# Patient Record
Sex: MALE | Race: WHITE | Employment: FULL TIME | URBAN - METROPOLITAN AREA
[De-identification: names, ages, dates, MRNs, and addresses within clinical notes are randomized per-mention and may not be internally consistent; named-entity substitution may affect disease eponyms.]

---

## 2017-05-23 PROBLEM — M10.9 GOUT INVOLVING TOE: Status: ACTIVE | Noted: 2017-05-23

## 2017-05-23 PROBLEM — E78.00 PURE HYPERCHOLESTEROLEMIA: Status: ACTIVE | Noted: 2017-05-23

## 2017-12-28 RX ORDER — LOSARTAN POTASSIUM 50 MG/1
50 TABLET ORAL DAILY
Qty: 30 TABLET | Refills: 5 | Status: SHIPPED | OUTPATIENT
Start: 2017-12-28 | End: 2018-01-09 | Stop reason: SDUPTHER

## 2018-01-09 PROBLEM — I10 ESSENTIAL HYPERTENSION: Status: ACTIVE | Noted: 2018-01-09

## 2018-01-09 PROBLEM — M10.079 IDIOPATHIC GOUT INVOLVING TOE: Status: ACTIVE | Noted: 2018-01-09

## 2020-02-21 ENCOUNTER — ANESTHESIA (OUTPATIENT)
Dept: OPERATING ROOM | Age: 45
End: 2020-02-21
Payer: MEDICARE

## 2020-02-21 ENCOUNTER — ANESTHESIA EVENT (OUTPATIENT)
Dept: OPERATING ROOM | Age: 45
End: 2020-02-21
Payer: MEDICARE

## 2020-02-21 ENCOUNTER — HOSPITAL ENCOUNTER (OUTPATIENT)
Age: 45
Setting detail: OUTPATIENT SURGERY
Discharge: HOME | End: 2020-02-21
Attending: SURGERY | Admitting: SURGERY
Payer: MEDICARE

## 2020-02-21 VITALS
RESPIRATION RATE: 12 BRPM | HEART RATE: 74 BPM | DIASTOLIC BLOOD PRESSURE: 69 MMHG | HEIGHT: 75 IN | SYSTOLIC BLOOD PRESSURE: 112 MMHG | TEMPERATURE: 97.5 F | BODY MASS INDEX: 38.3 KG/M2 | WEIGHT: 308 LBS | OXYGEN SATURATION: 93 %

## 2020-02-21 VITALS — OXYGEN SATURATION: 100 % | DIASTOLIC BLOOD PRESSURE: 54 MMHG | SYSTOLIC BLOOD PRESSURE: 109 MMHG | TEMPERATURE: 98.2 F

## 2020-02-21 PROCEDURE — 3600000013 HC SURGERY LEVEL 3 ADDTL 15MIN: Performed by: SURGERY

## 2020-02-21 PROCEDURE — 3700000000 HC ANESTHESIA ATTENDED CARE: Performed by: SURGERY

## 2020-02-21 PROCEDURE — 3600000003 HC SURGERY LEVEL 3 BASE: Performed by: SURGERY

## 2020-02-21 PROCEDURE — 6360000002 HC RX W HCPCS: Performed by: SURGERY

## 2020-02-21 PROCEDURE — 7100000031 HC ASPR PHASE II RECOVERY - ADDTL 15 MIN: Performed by: SURGERY

## 2020-02-21 PROCEDURE — 6360000002 HC RX W HCPCS: Performed by: NURSE ANESTHETIST, CERTIFIED REGISTERED

## 2020-02-21 PROCEDURE — 7100000030 HC ASPR PHASE II RECOVERY - FIRST 15 MIN: Performed by: SURGERY

## 2020-02-21 PROCEDURE — 7100000000 HC PACU RECOVERY - FIRST 15 MIN: Performed by: SURGERY

## 2020-02-21 PROCEDURE — 7100000001 HC PACU RECOVERY - ADDTL 15 MIN: Performed by: SURGERY

## 2020-02-21 PROCEDURE — 3700000001 HC ADD 15 MINUTES (ANESTHESIA): Performed by: SURGERY

## 2020-02-21 PROCEDURE — 2500000003 HC RX 250 WO HCPCS: Performed by: NURSE ANESTHETIST, CERTIFIED REGISTERED

## 2020-02-21 PROCEDURE — 2580000003 HC RX 258: Performed by: ANESTHESIOLOGY

## 2020-02-21 PROCEDURE — 6370000000 HC RX 637 (ALT 250 FOR IP): Performed by: ANESTHESIOLOGY

## 2020-02-21 PROCEDURE — C1781 MESH (IMPLANTABLE): HCPCS | Performed by: SURGERY

## 2020-02-21 PROCEDURE — 2709999900 HC NON-CHARGEABLE SUPPLY: Performed by: SURGERY

## 2020-02-21 PROCEDURE — 2580000003 HC RX 258: Performed by: NURSE ANESTHETIST, CERTIFIED REGISTERED

## 2020-02-21 DEVICE — PATCH HERN L DIA3.2IN CIR W/ STRP SEPRA TECHNOLOGY ABSRB: Type: IMPLANTABLE DEVICE | Site: ABDOMEN | Status: FUNCTIONAL

## 2020-02-21 RX ORDER — ONDANSETRON 2 MG/ML
INJECTION INTRAMUSCULAR; INTRAVENOUS PRN
Status: DISCONTINUED | OUTPATIENT
Start: 2020-02-21 | End: 2020-02-21 | Stop reason: SDUPTHER

## 2020-02-21 RX ORDER — GLYCOPYRROLATE 1 MG/5 ML
SYRINGE (ML) INTRAVENOUS PRN
Status: DISCONTINUED | OUTPATIENT
Start: 2020-02-21 | End: 2020-02-21 | Stop reason: SDUPTHER

## 2020-02-21 RX ORDER — SULFAMETHOXAZOLE AND TRIMETHOPRIM 800; 160 MG/1; MG/1
1 TABLET ORAL 2 TIMES DAILY
Qty: 20 TABLET | Refills: 0 | Status: SHIPPED | OUTPATIENT
Start: 2020-02-21 | End: 2020-03-02

## 2020-02-21 RX ORDER — DIPHENHYDRAMINE HYDROCHLORIDE 50 MG/ML
12.5 INJECTION INTRAMUSCULAR; INTRAVENOUS
Status: DISCONTINUED | OUTPATIENT
Start: 2020-02-21 | End: 2020-02-21 | Stop reason: HOSPADM

## 2020-02-21 RX ORDER — ONDANSETRON 4 MG/1
TABLET, FILM COATED ORAL
Qty: 20 TABLET | Refills: 0 | Status: SHIPPED | OUTPATIENT
Start: 2020-02-21 | End: 2020-05-14

## 2020-02-21 RX ORDER — MORPHINE SULFATE 2 MG/ML
2 INJECTION, SOLUTION INTRAMUSCULAR; INTRAVENOUS EVERY 5 MIN PRN
Status: DISCONTINUED | OUTPATIENT
Start: 2020-02-21 | End: 2020-02-21 | Stop reason: HOSPADM

## 2020-02-21 RX ORDER — DEXAMETHASONE SODIUM PHOSPHATE 4 MG/ML
INJECTION, SOLUTION INTRA-ARTICULAR; INTRALESIONAL; INTRAMUSCULAR; INTRAVENOUS; SOFT TISSUE PRN
Status: DISCONTINUED | OUTPATIENT
Start: 2020-02-21 | End: 2020-02-21 | Stop reason: SDUPTHER

## 2020-02-21 RX ORDER — SODIUM CHLORIDE, SODIUM LACTATE, POTASSIUM CHLORIDE, CALCIUM CHLORIDE 600; 310; 30; 20 MG/100ML; MG/100ML; MG/100ML; MG/100ML
INJECTION, SOLUTION INTRAVENOUS CONTINUOUS
Status: DISCONTINUED | OUTPATIENT
Start: 2020-02-21 | End: 2020-02-21 | Stop reason: HOSPADM

## 2020-02-21 RX ORDER — NEOSTIGMINE METHYLSULFATE 5 MG/5 ML
SYRINGE (ML) INTRAVENOUS PRN
Status: DISCONTINUED | OUTPATIENT
Start: 2020-02-21 | End: 2020-02-21 | Stop reason: SDUPTHER

## 2020-02-21 RX ORDER — LIDOCAINE HYDROCHLORIDE 10 MG/ML
INJECTION, SOLUTION EPIDURAL; INFILTRATION; INTRACAUDAL; PERINEURAL PRN
Status: DISCONTINUED | OUTPATIENT
Start: 2020-02-21 | End: 2020-02-21 | Stop reason: SDUPTHER

## 2020-02-21 RX ORDER — MIDAZOLAM HYDROCHLORIDE 1 MG/ML
INJECTION INTRAMUSCULAR; INTRAVENOUS PRN
Status: DISCONTINUED | OUTPATIENT
Start: 2020-02-21 | End: 2020-02-21 | Stop reason: SDUPTHER

## 2020-02-21 RX ORDER — ONDANSETRON 2 MG/ML
4 INJECTION INTRAMUSCULAR; INTRAVENOUS
Status: DISCONTINUED | OUTPATIENT
Start: 2020-02-21 | End: 2020-02-21 | Stop reason: HOSPADM

## 2020-02-21 RX ORDER — SODIUM CHLORIDE, SODIUM LACTATE, POTASSIUM CHLORIDE, CALCIUM CHLORIDE 600; 310; 30; 20 MG/100ML; MG/100ML; MG/100ML; MG/100ML
INJECTION, SOLUTION INTRAVENOUS CONTINUOUS PRN
Status: DISCONTINUED | OUTPATIENT
Start: 2020-02-21 | End: 2020-02-21 | Stop reason: SDUPTHER

## 2020-02-21 RX ORDER — MEPERIDINE HYDROCHLORIDE 25 MG/ML
12.5 INJECTION INTRAMUSCULAR; INTRAVENOUS; SUBCUTANEOUS EVERY 5 MIN PRN
Status: DISCONTINUED | OUTPATIENT
Start: 2020-02-21 | End: 2020-02-21 | Stop reason: HOSPADM

## 2020-02-21 RX ORDER — PROPOFOL 10 MG/ML
INJECTION, EMULSION INTRAVENOUS PRN
Status: DISCONTINUED | OUTPATIENT
Start: 2020-02-21 | End: 2020-02-21 | Stop reason: SDUPTHER

## 2020-02-21 RX ORDER — FENTANYL CITRATE 50 UG/ML
INJECTION, SOLUTION INTRAMUSCULAR; INTRAVENOUS PRN
Status: DISCONTINUED | OUTPATIENT
Start: 2020-02-21 | End: 2020-02-21 | Stop reason: SDUPTHER

## 2020-02-21 RX ORDER — HYDROMORPHONE HCL 110MG/55ML
PATIENT CONTROLLED ANALGESIA SYRINGE INTRAVENOUS PRN
Status: DISCONTINUED | OUTPATIENT
Start: 2020-02-21 | End: 2020-02-21 | Stop reason: SDUPTHER

## 2020-02-21 RX ORDER — OXYCODONE HYDROCHLORIDE AND ACETAMINOPHEN 5; 325 MG/1; MG/1
2 TABLET ORAL EVERY 4 HOURS PRN
Status: DISCONTINUED | OUTPATIENT
Start: 2020-02-21 | End: 2020-02-21 | Stop reason: HOSPADM

## 2020-02-21 RX ORDER — CEPHALEXIN 500 MG/1
CAPSULE ORAL
Qty: 21 CAPSULE | Refills: 0 | Status: SHIPPED | OUTPATIENT
Start: 2020-02-21 | End: 2020-05-14

## 2020-02-21 RX ORDER — LABETALOL 20 MG/4 ML (5 MG/ML) INTRAVENOUS SYRINGE
5 EVERY 10 MIN PRN
Status: DISCONTINUED | OUTPATIENT
Start: 2020-02-21 | End: 2020-02-21 | Stop reason: HOSPADM

## 2020-02-21 RX ORDER — ROCURONIUM BROMIDE 10 MG/ML
INJECTION, SOLUTION INTRAVENOUS PRN
Status: DISCONTINUED | OUTPATIENT
Start: 2020-02-21 | End: 2020-02-21 | Stop reason: SDUPTHER

## 2020-02-21 RX ADMIN — ROCURONIUM BROMIDE 50 MG: 10 INJECTION, SOLUTION INTRAVENOUS at 07:50

## 2020-02-21 RX ADMIN — Medication 0.8 MG: at 08:51

## 2020-02-21 RX ADMIN — FENTANYL CITRATE 100 MCG: 50 INJECTION, SOLUTION INTRAMUSCULAR; INTRAVENOUS at 07:50

## 2020-02-21 RX ADMIN — ONDANSETRON 4 MG: 2 INJECTION INTRAMUSCULAR; INTRAVENOUS at 08:15

## 2020-02-21 RX ADMIN — Medication 3 G: at 08:02

## 2020-02-21 RX ADMIN — PROPOFOL 200 MG: 10 INJECTION, EMULSION INTRAVENOUS at 07:50

## 2020-02-21 RX ADMIN — DEXAMETHASONE SODIUM PHOSPHATE 4 MG: 4 INJECTION, SOLUTION INTRA-ARTICULAR; INTRALESIONAL; INTRAMUSCULAR; INTRAVENOUS; SOFT TISSUE at 08:15

## 2020-02-21 RX ADMIN — ROCURONIUM BROMIDE 10 MG: 10 INJECTION, SOLUTION INTRAVENOUS at 08:13

## 2020-02-21 RX ADMIN — OXYCODONE HYDROCHLORIDE AND ACETAMINOPHEN 2 TABLET: 5; 325 TABLET ORAL at 10:27

## 2020-02-21 RX ADMIN — LIDOCAINE HYDROCHLORIDE 50 MG: 10 INJECTION, SOLUTION EPIDURAL; INFILTRATION; INTRACAUDAL; PERINEURAL at 07:50

## 2020-02-21 RX ADMIN — Medication 3 MG: at 08:51

## 2020-02-21 RX ADMIN — FENTANYL CITRATE 100 MCG: 50 INJECTION, SOLUTION INTRAMUSCULAR; INTRAVENOUS at 08:14

## 2020-02-21 RX ADMIN — SODIUM CHLORIDE, POTASSIUM CHLORIDE, SODIUM LACTATE AND CALCIUM CHLORIDE: 600; 310; 30; 20 INJECTION, SOLUTION INTRAVENOUS at 07:47

## 2020-02-21 RX ADMIN — HYDROMORPHONE HYDROCHLORIDE 1 MG: 2 INJECTION, SOLUTION INTRAMUSCULAR; INTRAVENOUS; SUBCUTANEOUS at 09:08

## 2020-02-21 RX ADMIN — SODIUM CHLORIDE, POTASSIUM CHLORIDE, SODIUM LACTATE AND CALCIUM CHLORIDE: 600; 310; 30; 20 INJECTION, SOLUTION INTRAVENOUS at 07:32

## 2020-02-21 RX ADMIN — HYDROMORPHONE HYDROCHLORIDE 1 MG: 2 INJECTION, SOLUTION INTRAMUSCULAR; INTRAVENOUS; SUBCUTANEOUS at 09:03

## 2020-02-21 RX ADMIN — MIDAZOLAM 2 MG: 1 INJECTION INTRAMUSCULAR; INTRAVENOUS at 07:47

## 2020-02-21 ASSESSMENT — PULMONARY FUNCTION TESTS
PIF_VALUE: 21
PIF_VALUE: 13
PIF_VALUE: 21
PIF_VALUE: 5
PIF_VALUE: 14
PIF_VALUE: 23
PIF_VALUE: 23
PIF_VALUE: 1
PIF_VALUE: 24
PIF_VALUE: 22
PIF_VALUE: 23
PIF_VALUE: 25
PIF_VALUE: 1
PIF_VALUE: 24
PIF_VALUE: 21
PIF_VALUE: 20
PIF_VALUE: 24
PIF_VALUE: 23
PIF_VALUE: 1
PIF_VALUE: 29
PIF_VALUE: 21
PIF_VALUE: 21
PIF_VALUE: 25
PIF_VALUE: 24
PIF_VALUE: 24
PIF_VALUE: 25
PIF_VALUE: 23
PIF_VALUE: 13
PIF_VALUE: 24
PIF_VALUE: 24
PIF_VALUE: 23
PIF_VALUE: 21
PIF_VALUE: 25
PIF_VALUE: 21
PIF_VALUE: 22
PIF_VALUE: 21
PIF_VALUE: 21
PIF_VALUE: 24
PIF_VALUE: 25
PIF_VALUE: 26
PIF_VALUE: 21
PIF_VALUE: 6
PIF_VALUE: 1
PIF_VALUE: 21
PIF_VALUE: 23
PIF_VALUE: 25
PIF_VALUE: 10
PIF_VALUE: 21
PIF_VALUE: 1
PIF_VALUE: 29
PIF_VALUE: 25
PIF_VALUE: 22
PIF_VALUE: 20
PIF_VALUE: 1
PIF_VALUE: 22
PIF_VALUE: 22
PIF_VALUE: 24
PIF_VALUE: 25
PIF_VALUE: 26
PIF_VALUE: 23
PIF_VALUE: 23
PIF_VALUE: 20
PIF_VALUE: 24
PIF_VALUE: 22
PIF_VALUE: 24
PIF_VALUE: 6
PIF_VALUE: 24
PIF_VALUE: 21
PIF_VALUE: 25
PIF_VALUE: 13
PIF_VALUE: 1
PIF_VALUE: 21
PIF_VALUE: 22
PIF_VALUE: 22

## 2020-02-21 ASSESSMENT — PAIN DESCRIPTION - PAIN TYPE
TYPE: SURGICAL PAIN

## 2020-02-21 ASSESSMENT — PAIN SCALES - GENERAL
PAINLEVEL_OUTOF10: 7
PAINLEVEL_OUTOF10: 3
PAINLEVEL_OUTOF10: 0
PAINLEVEL_OUTOF10: 8
PAINLEVEL_OUTOF10: 6
PAINLEVEL_OUTOF10: 7
PAINLEVEL_OUTOF10: 3
PAINLEVEL_OUTOF10: 8

## 2020-02-21 ASSESSMENT — PAIN DESCRIPTION - DESCRIPTORS
DESCRIPTORS: ACHING;SHARP
DESCRIPTORS: ACHING;SHARP
DESCRIPTORS: ACHING
DESCRIPTORS: ACHING;SHARP
DESCRIPTORS: ACHING;SHARP

## 2020-02-21 ASSESSMENT — LIFESTYLE VARIABLES: SMOKING_STATUS: 0

## 2020-02-21 ASSESSMENT — PAIN DESCRIPTION - ORIENTATION
ORIENTATION: ANTERIOR
ORIENTATION: ANTERIOR

## 2020-02-21 ASSESSMENT — ENCOUNTER SYMPTOMS
SHORTNESS OF BREATH: 0
STRIDOR: 0

## 2020-02-21 ASSESSMENT — PAIN DESCRIPTION - LOCATION
LOCATION: ABDOMEN

## 2020-02-21 ASSESSMENT — PAIN - FUNCTIONAL ASSESSMENT: PAIN_FUNCTIONAL_ASSESSMENT: 0-10

## 2020-02-21 NOTE — ANESTHESIA POSTPROCEDURE EVALUATION
POST- ANESTHESIA EVALUATION       Pt Name: Quynh Connor  MRN: [id number]  YOB: 1975  Date of evaluation: 2/21/2020  Time:  10:35 AM      /78   Pulse 76   Temp 97 °F (36.1 °C) (Temporal)   Resp 12   Ht 6' 3\" (1.905 m)   Wt (!) 308 lb (139.7 kg)   SpO2 95%   BMI 38.50 kg/m²      Consciousness Level  Awake  Cardiopulmonary Status  Stable  Pain Adequately Treated YES  Nausea / Vomiting  NO  Adequate Hydration  YES  Anesthesia Related Complications NONE      Electronically signed by Saranya Ferguson MD on 2/21/2020 at 10:35 AM       Department of Anesthesiology  Postprocedure Note    Patient: Quynh Connor  MRN: [id number]  YOB: 1975  Date of evaluation: 2/21/2020  Time:  10:35 AM     Procedure Summary     Date:  02/21/20 Room / Location:  [address] [place] [address] Ousmane Aimn 06 / [facility]: [facility]    Anesthesia Start:  9774 Anesthesia Stop:  0489    Procedure: HERNIA UMBILICAL VENTRAL REPAIR OPEN W/MESH (N/A Abdomen) Diagnosis:  (UMBILICAL VENTRAL HERNIA  (PAT ON ADMIT OFFICE TO [facility]))    Surgeon:  Jael High MD Responsible Provider:  Emerson Velazquez MD    Anesthesia Type:  general ASA Status:  2          Anesthesia Type: general    Chrystal Phase I: Chrystal Score: 10    Chrystal Phase II:      Last vitals: Reviewed and per EMR flowsheets.        Anesthesia Post Evaluation

## 2020-02-21 NOTE — OP NOTE
Preoperative diagnosis: Symptomatic periumbilical ventral hernia    Postoperative diagnosis: Same    Procedure: Open repair symptomatic periumbilical ventral hernia with large ventral Yeison ST mesh    Surgeon: Dr. Yolanda Hussein    Asst.: None    Anesthesia: General    Preparation: ChloraPrep    EBL: Less than 25 mL    Specimen: None    Procedure: 80-year-old male with a large periumbilical ventral hernia was scheduled for repair. Informed consent was obtained. Preoperative antibiotics were given. Patient was taken to the operating room. General anesthesia was given. Abdomen was prepped and draped in usual sterile fashion. Timeout was done. Periumbilical incision was made. Dissection was carried out. Patient was found to have a large amount of incarcerated omentum that was reduced. Preperitoneal space was created. Fascial edges were grasped with help of Kocher clamps. A large ventralex- ST mesh was used for repair. This was inserted in the preperitoneal space. Hemostasis was confirmed. Sponge needle instrument count was found to be correct. Mesh was secured in several different locations using interrupted Ethibond sutures. Satisfactory tension-free repair was achieved. Hemostasis was confirmed. Sponge needle instrument count was found to be correct. Osmar-Gao drain was left in the area of dissection was brought out and secured. After confirming hemostasis sponge needle instrument count abdominal wall layers were approximated using absorbable sutures. Dermabond was applied. Steri-Strips applied. Sterile dressing was applied. Patient tolerated procedure well and was transferred to the recovery room in a stable condition. Recommendations: No lifting more than 5 to 10 pounds for 6 weeks. Diet as tolerated. No driving until seen in the office. Patient may shower. Prescriptions are in the chart. Patient gets his narcotic prescriptions from the pain clinic.

## 2020-02-21 NOTE — H&P
HISTORY and Treinta MARY [facility] [address]       NAME:  Beata Jackson  MRN: [id number]   YOB: 1975   Date: 2/21/2020   Age: 39 y.o. Gender: male       COMPLAINT AND PRESENT HISTORY:   Beata Jackson is 39 y.o.,  male, Is here for  Umbilical  Ventral Hernia. Patient will be having HERNIA UMBILICAL VENTRAL REPAIR OPEN W/MESH  Patient has symptoms of : bulging out and discomfort. Patient noticed the Hernia 2 years ago. The Hernia grew in size. The Hernia is  tender, reducible, expansile on coughing. Pt is more relieved with hernia when: lying down, standing aggravates hernia more. Pt denies any constipation or bowel blockage. Patient has some head congestion and cough that he says clear more in the day. No fever or chills.      PAST MEDICAL HISTORY     Past Medical History:   Diagnosis Date    Chronic back pain     cervical    GERD (gastroesophageal reflux disease)     Gout     Lt  Big Toe     Hyperlipidemia     Kidney stone     Low back pain     Renal insufficiency, mild     Seasonal allergies     hives    Umbilical hernia        SURGICAL HISTORY       Past Surgical History:   Procedure Laterality Date    KIDNEY SURGERY      Stent Placement    TONSILLECTOMY  1982    URETHRAL STRICTURE DILATATION  2001    WRIST SURGERY  2000    Rt       FAMILY HISTORY       Family History   Problem Relation Age of Onset    Diabetes Mother     High Blood Pressure Mother     Heart Disease Mother     Other Mother         DVT    Diabetes Father     Heart Disease Father        SOCIAL HISTORY       Social History     Socioeconomic History    Marital status:      Spouse name: None    Number of children: None    Years of education: None    Highest education level: None   Occupational History    None   Social Needs    Financial resource strain: None    Food insecurity:     Worry: None     Inability: None    Transportation needs:     Medical: None     Non-medical: None Tobacco Use    Smoking status: Never Smoker    Smokeless tobacco: Never Used   Substance and Sexual Activity    Alcohol use: Yes     Comment: socially    Drug use: No    Sexual activity: Yes     Partners: Female   Lifestyle    Physical activity:     Days per week: None     Minutes per session: None    Stress: None   Relationships    Social connections:     Talks on phone: None     Gets together: None     Attends Hoahaoism service: None     Active member of club or organization: None     Attends meetings of clubs or organizations: None     Relationship status: None    Intimate partner violence:     Fear of current or ex partner: None     Emotionally abused: None     Physically abused: None     Forced sexual activity: None   Other Topics Concern    None   Social History Narrative    None           REVIEW OF SYSTEMS      Allergies   Allergen Reactions    Seasonal        No current facility-administered medications on file prior to encounter. Current Outpatient Medications on File Prior to Encounter   Medication Sig Dispense Refill    losartan (COZAAR) 50 MG tablet take 1 tablet by mouth once daily 30 tablet 5    simvastatin (ZOCOR) 20 MG tablet take 1 tablet by mouth every evening 90 tablet 3    allopurinol (ZYLOPRIM) 300 MG tablet take 1 tablet by mouth once daily 90 tablet 3    oxyCODONE-acetaminophen (PERCOCET)  MG per tablet Pain management . 0    naproxen (NAPROSYN) 500 MG tablet Take 1 tablet by mouth 2 times daily (with meals) 60 tablet 3    tiZANidine (ZANAFLEX) 2 MG tablet Take 1 tablet by mouth 3 times daily as needed  0    omalizumab (XOLAIR) 150 MG injection Inject 150 mg into the skin every 28 days. 1 vial 3    ranitidine (ZANTAC) 150 MG tablet Take 1 tablet by mouth daily. 30 tablet 11    fexofenadine (ALLEGRA) 180 MG tablet Take 180 mg by mouth daily      EPIPEN 2-LILI 0.3 MG/0.3ML VLAD injection          Negative except for what is mentioned in the HPI.      GENERAL

## 2020-02-21 NOTE — ANESTHESIA PRE PROCEDURE
Department of Anesthesiology  Preprocedure Note       Name:  Felicia Dalton   Age:  39 y.o.  :  1975                                          MRN:  [id number]         Date:  2020      Surgeon: Javon Yo):  Albert Madrigal MD    Procedure: HERNIA UMBILICAL VENTRAL REPAIR OPEN W/MESH (N/A Abdomen)    Medications prior to admission:   Prior to Admission medications    Medication Sig Start Date End Date Taking? Authorizing Provider   losartan (COZAAR) 50 MG tablet take 1 tablet by mouth once daily 19  Yes Kevon Elaine, APRN - CNP   simvastatin (ZOCOR) 20 MG tablet take 1 tablet by mouth every evening 10/2/19  Yes Kevon Elaine, APRN - CNP   allopurinol (ZYLOPRIM) 300 MG tablet take 1 tablet by mouth once daily 10/2/19  Yes Kevon Saundersing, APRN - CNP   oxyCODONE-acetaminophen (PERCOCET)  MG per tablet Pain management . 17  Yes Historical Provider, MD   naproxen (NAPROSYN) 500 MG tablet Take 1 tablet by mouth 2 times daily (with meals) 10/14/16  Yes Kevon Saundersing, APRN - CNP   tiZANidine (ZANAFLEX) 2 MG tablet Take 1 tablet by mouth 3 times daily as needed 6/4/15  Yes Historical Provider, MD   omalizumab Keegan Diehl) 150 MG injection Inject 150 mg into the skin every 28 days. 3/24/15  Yes Kevon Saundersing, APRN - CNP   ranitidine (ZANTAC) 150 MG tablet Take 1 tablet by mouth daily. 3/15/13  Yes Kevon Elaine, APRN - CNP   fexofenadine (ALLEGRA) 180 MG tablet Take 180 mg by mouth daily    Historical Provider, MD   EPIPEN 2-LILI 0.3 MG/0.3ML VLAD injection  13   Historical Provider, MD       Current medications:    Current Facility-Administered Medications   Medication Dose Route Frequency Provider Last Rate Last Dose    lactated ringers infusion   Intravenous Continuous Zara Jackson  mL/hr at 20 0732      ceFAZolin (ANCEF) 3 g in dextrose 5 % 100 mL IVPB  3 g Intravenous Once Albert Madrigal MD           Allergies:     Allergies   Allergen Reactions    Seasonal        Problem List: Blocker         Neuro/Psych:   Negative Neuro/Psych ROS     (-) seizures, neuromuscular disease, TIA, CVA, headaches, psychiatric history and depression/anxiety            GI/Hepatic/Renal:   (+) GERD: no interval change,      (-) hiatal hernia, PUD, hepatitis, liver disease, no renal disease, bowel prep and no morbid obesity       Endo/Other: Negative Endo/Other ROS   (+) no malignancy/cancer. (-) diabetes mellitus, hypothyroidism, hyperthyroidism, blood dyscrasia, arthritis, no electrolyte abnormalities, no malignancy/cancer               Abdominal:           Vascular: negative vascular ROS. - PVD, DVT and PE. Anesthesia Plan      general     ASA 2       Induction: intravenous. MIPS: Postoperative opioids intended and Prophylactic antiemetics administered. Anesthetic plan and risks discussed with patient. Plan discussed with CRNA.                   Shyla Luna MD   2/21/2020

## 2020-05-14 PROBLEM — Z87.19 STATUS POST HERNIA REPAIR: Status: ACTIVE | Noted: 2020-05-14

## 2020-05-14 PROBLEM — Z98.890 STATUS POST HERNIA REPAIR: Status: ACTIVE | Noted: 2020-05-14

## 2020-05-14 PROBLEM — Z87.442 HISTORY OF KIDNEY STONES: Status: ACTIVE | Noted: 2020-05-14

## 2020-05-22 ENCOUNTER — HOSPITAL ENCOUNTER (OUTPATIENT)
Age: 45
Setting detail: SPECIMEN
Discharge: HOME | End: 2020-05-22
Payer: MEDICARE

## 2020-05-22 LAB
ALBUMIN SERPL-MCNC: 4 G/DL (ref 3.5–5.2)
ALBUMIN/GLOBULIN RATIO: 1.3 (ref 1–2.5)
ALP BLD-CCNC: 104 U/L (ref 40–129)
ALT SERPL-CCNC: 28 U/L (ref 5–41)
ANION GAP SERPL CALCULATED.3IONS-SCNC: 14 MMOL/L (ref 9–17)
AST SERPL-CCNC: 21 U/L
BILIRUB SERPL-MCNC: 0.26 MG/DL (ref 0.3–1.2)
BUN BLDV-MCNC: 18 MG/DL (ref 6–20)
BUN/CREAT BLD: ABNORMAL (ref 9–20)
CALCIUM SERPL-MCNC: 9.1 MG/DL (ref 8.6–10.4)
CHLORIDE BLD-SCNC: 103 MMOL/L (ref 98–107)
CHOLESTEROL/HDL RATIO: 4.4
CHOLESTEROL: 150 MG/DL
CO2: 23 MMOL/L (ref 20–31)
CREAT SERPL-MCNC: 1.24 MG/DL (ref 0.7–1.2)
GFR AFRICAN AMERICAN: >60 ML/MIN
GFR NON-AFRICAN AMERICAN: >60 ML/MIN
GFR SERPL CREATININE-BSD FRML MDRD: ABNORMAL ML/MIN/{1.73_M2}
GFR SERPL CREATININE-BSD FRML MDRD: ABNORMAL ML/MIN/{1.73_M2}
GLUCOSE BLD-MCNC: 80 MG/DL (ref 70–99)
HCT VFR BLD CALC: 46.5 % (ref 40.7–50.3)
HDLC SERPL-MCNC: 34 MG/DL
HEMOGLOBIN: 14.4 G/DL (ref 13–17)
LDL CHOLESTEROL: 82 MG/DL (ref 0–130)
MCH RBC QN AUTO: 29.1 PG (ref 25.2–33.5)
MCHC RBC AUTO-ENTMCNC: 31 G/DL (ref 28.4–34.8)
MCV RBC AUTO: 93.9 FL (ref 82.6–102.9)
NRBC AUTOMATED: 0 PER 100 WBC
PDW BLD-RTO: 13.4 % (ref 11.8–14.4)
PLATELET # BLD: 262 K/UL (ref 138–453)
PMV BLD AUTO: 10.6 FL (ref 8.1–13.5)
POTASSIUM SERPL-SCNC: 4.8 MMOL/L (ref 3.7–5.3)
RBC # BLD: 4.95 M/UL (ref 4.21–5.77)
SODIUM BLD-SCNC: 140 MMOL/L (ref 135–144)
TOTAL PROTEIN: 7 G/DL (ref 6.4–8.3)
TRIGL SERPL-MCNC: 168 MG/DL
URIC ACID: 5.1 MG/DL (ref 3.4–7)
VLDLC SERPL CALC-MCNC: ABNORMAL MG/DL (ref 1–30)
WBC # BLD: 6 K/UL (ref 3.5–11.3)

## 2020-05-24 LAB
ESTIMATED AVERAGE GLUCOSE: 114 MG/DL
HBA1C MFR BLD: 5.6 % (ref 4–6)

## 2024-02-29 ENCOUNTER — TELEPHONE (OUTPATIENT)
Age: 49
End: 2024-02-29

## 2024-02-29 NOTE — TELEPHONE ENCOUNTER
Called patient to schedule surgery. 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   Will call back later to get an update with scheduling form the patient.

## 2025-01-29 ENCOUNTER — HOSPITAL ENCOUNTER (OUTPATIENT)
Age: 50
Setting detail: SPECIMEN
Discharge: HOME | End: 2025-01-29

## 2025-01-29 DIAGNOSIS — N28.9 RENAL INSUFFICIENCY, MILD: ICD-10-CM

## 2025-01-29 DIAGNOSIS — R73.9 HYPERGLYCEMIA: ICD-10-CM

## 2025-01-29 DIAGNOSIS — Z13.9 SCREENING FOR CONDITION: ICD-10-CM

## 2025-01-29 DIAGNOSIS — I10 ESSENTIAL HYPERTENSION: ICD-10-CM

## 2025-01-29 DIAGNOSIS — M10.079 IDIOPATHIC GOUT INVOLVING TOE, UNSPECIFIED CHRONICITY, UNSPECIFIED LATERALITY: ICD-10-CM

## 2025-01-29 DIAGNOSIS — E78.00 PURE HYPERCHOLESTEROLEMIA: ICD-10-CM

## 2025-01-29 LAB
ALBUMIN SERPL-MCNC: 4.3 G/DL (ref 3.5–5.2)
ALBUMIN/GLOB SERPL: 1.5 {RATIO} (ref 1–2.5)
ALP SERPL-CCNC: 113 U/L (ref 40–129)
ALT SERPL-CCNC: 57 U/L (ref 10–50)
ANION GAP SERPL CALCULATED.3IONS-SCNC: 13 MMOL/L (ref 9–16)
AST SERPL-CCNC: 45 U/L (ref 10–50)
BILIRUB SERPL-MCNC: 0.4 MG/DL (ref 0–1.2)
BUN SERPL-MCNC: 13 MG/DL (ref 6–20)
CALCIUM SERPL-MCNC: 9.6 MG/DL (ref 8.6–10.4)
CHLORIDE SERPL-SCNC: 104 MMOL/L (ref 98–107)
CHOLEST SERPL-MCNC: 139 MG/DL (ref 0–199)
CHOLESTEROL/HDL RATIO: 3.8
CO2 SERPL-SCNC: 25 MMOL/L (ref 20–31)
CREAT SERPL-MCNC: 1.3 MG/DL (ref 0.7–1.2)
ERYTHROCYTE [DISTWIDTH] IN BLOOD BY AUTOMATED COUNT: 13.4 % (ref 11.8–14.4)
EST. AVERAGE GLUCOSE BLD GHB EST-MCNC: 120 MG/DL
GFR, ESTIMATED: 67 ML/MIN/1.73M2
GLUCOSE SERPL-MCNC: 84 MG/DL (ref 74–99)
HBA1C MFR BLD: 5.8 % (ref 4–6)
HCT VFR BLD AUTO: 47.1 % (ref 40.7–50.3)
HDLC SERPL-MCNC: 37 MG/DL
HGB BLD-MCNC: 14.7 G/DL (ref 13–17)
LDLC SERPL CALC-MCNC: 64 MG/DL (ref 0–100)
MCH RBC QN AUTO: 29.2 PG (ref 25.2–33.5)
MCHC RBC AUTO-ENTMCNC: 31.2 G/DL (ref 28.4–34.8)
MCV RBC AUTO: 93.6 FL (ref 82.6–102.9)
NRBC BLD-RTO: 0 PER 100 WBC
PLATELET # BLD AUTO: 238 K/UL (ref 138–453)
PMV BLD AUTO: 10 FL (ref 8.1–13.5)
POTASSIUM SERPL-SCNC: 4.7 MMOL/L (ref 3.7–5.3)
PROT SERPL-MCNC: 7.1 G/DL (ref 6.6–8.7)
PSA SERPL-MCNC: 0.6 NG/ML (ref 0–4)
PSA SERPL-MCNC: 0.6 NG/ML (ref 0–4)
RBC # BLD AUTO: 5.03 M/UL (ref 4.21–5.77)
SODIUM SERPL-SCNC: 142 MMOL/L (ref 136–145)
TRIGL SERPL-MCNC: 192 MG/DL
URATE SERPL-MCNC: 7.2 MG/DL (ref 3.4–7)
VLDLC SERPL CALC-MCNC: 38 MG/DL (ref 1–30)
WBC OTHER # BLD: 6.5 K/UL (ref 3.5–11.3)

## 2025-01-30 LAB
BUN SERPL-MCNC: 13 MG/DL (ref 6–20)
CREAT SERPL-MCNC: 1.3 MG/DL (ref 0.7–1.2)
GFR, ESTIMATED: 67 ML/MIN/1.73M2

## 2025-07-14 ENCOUNTER — HOSPITAL ENCOUNTER (OUTPATIENT)
Age: 50
Setting detail: SPECIMEN
Discharge: HOME | End: 2025-07-14

## 2025-07-14 LAB
25(OH)D3 SERPL-MCNC: 15 NG/ML (ref 30–100)
ALBUMIN SERPL-MCNC: 4 G/DL (ref 3.5–5.2)
BASOPHILS # BLD: 0.05 K/UL (ref 0–0.2)
BASOPHILS NFR BLD: 1 % (ref 0–2)
BILIRUB UR QL STRIP: NEGATIVE
CALCIUM UR-MCNC: 9.1 MG/DL
CALCIUM, URINE: 209 MG/24 H (ref 100–300)
CLARITY UR: CLEAR
COLLECT DURATION TIME SPEC: 24 H
COLOR UR: YELLOW
COMMENT: NORMAL
CREAT UR-MCNC: 156 MG/DL (ref 39–259)
CREATINE 24H UR-MRATE: 2276 MG/24 H (ref 1040–2350)
CREATININE URINE: 99 MG/DL
EOSINOPHIL # BLD: 0.21 K/UL (ref 0–0.44)
EOSINOPHILS RELATIVE PERCENT: 3 % (ref 1–4)
ERYTHROCYTE [DISTWIDTH] IN BLOOD BY AUTOMATED COUNT: 13.5 % (ref 11.8–14.4)
GLUCOSE UR STRIP-MCNC: NEGATIVE MG/DL
HCT VFR BLD AUTO: 45.9 % (ref 40.7–50.3)
HGB BLD-MCNC: 14.6 G/DL (ref 13–17)
HGB UR QL STRIP.AUTO: NEGATIVE
IMM GRANULOCYTES # BLD AUTO: <0.03 K/UL (ref 0–0.3)
IMM GRANULOCYTES NFR BLD: 0 %
KETONES UR STRIP-MCNC: NEGATIVE MG/DL
LEUKOCYTE ESTERASE UR QL STRIP: NEGATIVE
LYMPHOCYTES NFR BLD: 2.89 K/UL (ref 1.1–3.7)
LYMPHOCYTES RELATIVE PERCENT: 47 % (ref 24–43)
MAGNESIUM 24 HOUR URINE: 80 MG/24 H (ref 72.9–121.5)
MAGNESIUM 24H UR-MCNC: 3.5 MG/DL
MAGNESIUM SERPL-MCNC: 2.1 MG/DL (ref 1.6–2.6)
MCH RBC QN AUTO: 29.3 PG (ref 25.2–33.5)
MCHC RBC AUTO-ENTMCNC: 31.8 G/DL (ref 28.4–34.8)
MCV RBC AUTO: 92 FL (ref 82.6–102.9)
MICROALBUMIN UR-MCNC: <12 MG/L (ref 0–20)
MICROALBUMIN/CREAT UR-RTO: NORMAL MCG/MG CREAT (ref 0–17)
MONOCYTES NFR BLD: 0.55 K/UL (ref 0.1–1.2)
MONOCYTES NFR BLD: 9 % (ref 3–12)
NEUTROPHILS NFR BLD: 40 % (ref 36–65)
NEUTS SEG NFR BLD: 2.43 K/UL (ref 1.5–8.1)
NITRITE UR QL STRIP: NEGATIVE
NRBC BLD-RTO: 0 PER 100 WBC
OSMOLALITY UR: 569 MOSM/KG (ref 80–1300)
PH UR STRIP: 6 [PH] (ref 5–8)
PHOSPHATE SERPL-MCNC: 2.8 MG/DL (ref 2.5–4.5)
PHOSPHORUS  URINE: 35.4 MG/DL
PHOSPHORUS, 24H UR: 814 MG/24 H (ref 400–1300)
PLATELET # BLD AUTO: 238 K/UL (ref 138–453)
PMV BLD AUTO: 10.1 FL (ref 8.1–13.5)
POTASSIUM 24 HOUR URINE: 40 MMOL/24 H (ref 25–125)
POTASSIUM, UR: 17.6 MMOL/L (ref 25–125)
PROT UR STRIP-MCNC: NEGATIVE MG/DL
PTH-INTACT SERPL-MCNC: 82 PG/ML (ref 17.9–58.6)
RBC # BLD AUTO: 4.99 M/UL (ref 4.21–5.77)
SODIUM 24 HOUR URINE: 198 MMOL/24 H (ref 40–220)
SODIUM URINE: 86 MMOL/L
SP GR UR STRIP: 1.02 (ref 1–1.03)
SPECIMEN VOL UR: 2299 ML
URIC ACID 24 HOUR URINE: 568 MG/24 H (ref 250–750)
URIC ACID, UR: 24.7 MG/DL
UROBILINOGEN UR STRIP-ACNC: NORMAL EU/DL (ref 0–1)
VOLUME: 2299 ML
WBC OTHER # BLD: 6.1 K/UL (ref 3.5–11.3)

## 2025-07-16 ENCOUNTER — HOSPITAL ENCOUNTER (OUTPATIENT)
Age: 50
Setting detail: SPECIMEN
Discharge: HOME | End: 2025-07-16

## 2025-07-16 LAB
ANION GAP SERPL CALCULATED.3IONS-SCNC: 11 MMOL/L (ref 9–16)
BUN SERPL-MCNC: 14 MG/DL (ref 6–20)
CALCIUM SERPL-MCNC: 9.4 MG/DL (ref 8.6–10.4)
CHLORIDE SERPL-SCNC: 103 MMOL/L (ref 98–107)
CITRIC ACID, U, 24HR: 175 MG/D (ref 320–1240)
CITRIC ACID, URINE: 76 MG/L
CITRIC ACID/CREATININE RATIO URINE: 80 MG/G
CO2 SERPL-SCNC: 25 MMOL/L (ref 20–31)
COLLECT DURATION TIME SPEC: 24 H
CREAT 24H UR-MCNC: 95 MG/DL
CREAT SERPL-MCNC: 1.3 MG/DL (ref 0.7–1.2)
CREATININE URINE /24 HR: 2184 MG/D (ref 1000–2500)
GFR, ESTIMATED: 67 ML/MIN/1.73M2
GLUCOSE SERPL-MCNC: 103 MG/DL (ref 74–99)
POTASSIUM SERPL-SCNC: 4.4 MMOL/L (ref 3.7–5.3)
SODIUM SERPL-SCNC: 139 MMOL/L (ref 136–145)
SPECIMEN VOL ?TM UR: 2299 ML

## 2025-07-17 LAB
COLLECT DURATION TIME SPEC: 24 H
CREAT 24H UR-MCNC: 98 MG/DL
CREATININE URINE /24 HR: 2253 MG/D (ref 1000–2500)
OXALATE 24 HOUR URINE: 32 MG/D (ref 16–49)
OXALATE URINE: 14 MG/L
SPECIMEN VOL ?TM UR: 2299 ML

## 2025-07-20 LAB
MISCELLANEOUS LAB TEST RESULT: NORMAL
TEST NAME: NORMAL

## (undated) DEVICE — SPONGE DRN W4XL4IN RAYON/POLYESTER 6 PLY NONWOVEN PRECUT

## (undated) DEVICE — Z DUPLICATE USE 2392649 LARYNGOSCOPE VID TI SPECTRM LOPRO S4 GLIDESCOPE

## (undated) DEVICE — SINGLE PORT MANIFOLD: Brand: NEPTUNE 2

## (undated) DEVICE — SUTURE ETHBND EXCEL SZ 0 L30IN NONABSORBABLE GRN CT1 L36MM X424H

## (undated) DEVICE — Z INACTIVE USE 2653177 SPONGE GZ W2XL2IN NONWOVEN 4 PLY FASTER WICKING ABIL AVANT

## (undated) DEVICE — DRESSING TRNSPAR W4XL10IN FLM MIC POR SURESITE 123

## (undated) DEVICE — SUTURE ETHBND EXCEL SZ 0 L18IN NONABSORBABLE GRN L26MM MO-6 CX45D

## (undated) DEVICE — STRAP,POSITIONING,KNEE/BODY,FOAM,4X60": Brand: MEDLINE

## (undated) DEVICE — STRIP,CLOSURE,WOUND,MEDI-STRIP,1/2X4: Brand: MEDLINE

## (undated) DEVICE — GOWN,AURORA,NONREINFORCED,LARGE: Brand: MEDLINE

## (undated) DEVICE — DRESSING TRNSPAR W2XL2.75IN FLM SHT SEMIPERMEABLE WIND

## (undated) DEVICE — PAD,NON-ADHERENT,3X8,STERILE,LF,1/PK: Brand: MEDLINE

## (undated) DEVICE — ST CHARLES MAJOR ABDOMINAL PK: Brand: MEDLINE INDUSTRIES, INC.

## (undated) DEVICE — TOTAL TRAY, DB, 100% SILI FOLEY, 16FR 10: Brand: MEDLINE

## (undated) DEVICE — SUTURE PERMAHAND SZ 0 L30IN NONABSORBABLE BLK FSL L30MM 3/8 680H

## (undated) DEVICE — ADHESIVE SKIN CLSR 0.7ML TOP DERMBND ADV

## (undated) DEVICE — SUTURE VCRL + SZ 2-0 L27IN ABSRB UD CT-2 L26MM 1/2 CIR TAPR VCP269H

## (undated) DEVICE — SPONGE GZ W4XL4IN RAYON POLY FILL CVR W/ NONWOVEN FAB

## (undated) DEVICE — SUTURE VCRL + SZ 3-0 L27IN ABSRB UD L26MM SH 1/2 CIR VCP416H

## (undated) DEVICE — KIT DRN FLAT W/ 100CC EVAC 10MM FULL PERF

## (undated) DEVICE — Z DISCONTINUED GLOVE SURG SZ 7.5 L12IN FNGR THK13MIL WHT ISOLEX

## (undated) DEVICE — STRAP POS MP 30X3 IN HK LOOP CLOSURE FOAM DISP

## (undated) DEVICE — BINDER ABD 2XL W9IN CIRC 62 73IN 3 PNL E HK AND LOOP CLSR W